# Patient Record
(demographics unavailable — no encounter records)

---

## 2024-11-28 NOTE — PHYSICAL EXAM
[Abdomen Masses] : No abdominal masses [Abdomen Tenderness] : ~T No ~M abdominal tenderness [Tender] : nontender [Tender, Swollen] : tender, swollen [Normal] : was normal [None] : there was no rectal abscess [___ Anterior] : a [unfilled] ~Ucm rectal mass was present anteriorly [Normal Breath Sounds] : Normal breath sounds [Normal Heart Sounds] : normal heart sounds [Normal Rate and Rhythm] : normal rate and rhythm [No Rash or Lesion] : No rash or lesion [Alert] : alert [Oriented to Person] : oriented to person [Oriented to Place] : oriented to place [Oriented to Time] : oriented to time [Calm] : calm [de-identified] : Looks well in no distress, of stated age. [de-identified] : Pupils equal reactive to light normocephalic atraumatic.

## 2024-11-28 NOTE — ASSESSMENT
[FreeTextEntry1] : 57-year-old male with distal rectal polyp close to the dentate line also has internal hemorrhoids and diverticulosis.  Recommend surgery with transanal excision of rectal polyp.  Risk and benefits of the surgery have been discussed which include bleeding, infection, sepsis, multiorgan failure, inadvertent injury including hollow viscus, solid organ, ureter neurovascular and neurological structures, DVT PE, heart attack, stroke, hernias, recurrence of tumor and death.  Will undergo presurgical testing with CBC BMP PT PTT EKG prophylactic antibiotics DVT prophylaxis, recommend high fiber diet, Metamucil daily, sitz baths, stool softeners, pain medications p.r.n.

## 2024-11-28 NOTE — HISTORY OF PRESENT ILLNESS
[FreeTextEntry1] : 57-year-old male recently had colonoscopy with Dr. Isac Goode found on colonoscopy to have a rectal polyp close to the dentate line that cannot be removed safely during colonoscopy also found to have hemorrhoids and diverticulosis.